# Patient Record
Sex: MALE | Race: WHITE | NOT HISPANIC OR LATINO | URBAN - METROPOLITAN AREA
[De-identification: names, ages, dates, MRNs, and addresses within clinical notes are randomized per-mention and may not be internally consistent; named-entity substitution may affect disease eponyms.]

---

## 2018-11-01 NOTE — PATIENT DISCUSSION
AMD (DRY), OS:  PRESCRIBE AREDS 2 VITAMINS / AMSLER GRID QD/ UV PROTECTION. SMOKING CESSATION EMPHASIZED. REFER TO DR. João Mcqueen.

## 2018-11-21 NOTE — PATIENT DISCUSSION
EPIMACULAR MEMBRANE, OD:  VISUALLY SIGNIFICANT. DISCUSSED SURGICAL OPTIONS - PATIENT NOT INTERESTED IN SURGERY AT THIS TIME. ELECTS TO PROCEED WITH PHACO SURGERY. RETURN AS SCHEDULED FOR OCT / EVALUATION.

## 2018-11-21 NOTE — PATIENT DISCUSSION
MYOPIC MACULAR DEGENERATION / AMD (WET), OS: DISCIFORM SCAR. REVIEWED RISK AND BENEFITS OF TREAT AND EXTEND REGIMEN VS PRN TREATMENT. THE PATIENT PREFERS TO CONTINUE WITH PRN TREATMENT.

## 2018-11-21 NOTE — PATIENT DISCUSSION
CNV Counseling: : I have reviewed with the patient the diagnosis of CHOROIDAL NEOVASCULARIZATION and its pathophysiology.  I further explained that this condition is a severe eye disease which should be monitored and treated by a retinal specialist.

## 2018-12-06 NOTE — PATIENT DISCUSSION
Surgery Counseling:  I have discussed the option of glasses versus cataract surgery versus following, It was explained that when vision no longer meets the patient's visual needs and a new prescription for glasses is not likely to improve the patient's visual symptoms, the option of cataract surgery is a reasonable next step. It was explained that there is no guarantee that removing the cataract will improve their visual symptoms; however, it is believed that the cataract is contributing to the patient's visual impairment and surgery may noticeably improve both the visual and functional status of the patient. After this discussion, the patient desires to proceed with cataract surgery with implantation of an intraocular lens to improve their vision for distance. I have prescribed Prolensa, Besivance, and Durezol to use as directed. Approved substitution of generics are Ofloxacin, Ketorolac, and Prednisolone.

## 2018-12-06 NOTE — PATIENT DISCUSSION
CATARACTS, OU- VISUALLY SIGNIFICANT. PT ELECTS TO PROCEED WITH SURGERY. SCHEDULE OS EYE FIRST THEN LATER IN THE OD EYE IF VISUAL SYMPTOMS PERSIST.

## 2018-12-06 NOTE — PATIENT DISCUSSION
New Prescription: ketorolac (ketorolac): drops: 0.5% 1 drop four times a day as directed into left eye 12-

## 2018-12-06 NOTE — PATIENT DISCUSSION
Plan for OS:  SN60WF 21.50  (distance) (if patient desires for OS can use Odella Rave but patient will need definitely Odella Rave for the right eye).

## 2018-12-06 NOTE — PATIENT DISCUSSION
New Prescription: prednisolone acetate (PF) (prednisolone acetate (pf)): drops,suspension: 1% 1 drop four times a day as directed into left eye 12-

## 2018-12-18 NOTE — PATIENT DISCUSSION
Post-Op Instructions: The patient was instructed in the proper use of post-operative eye drops: Ofloxacin in the surgical eye 4 times per day for 2 weeks, then discontinue; Ketorolac 4 times per day for 10 days, then decrease to 3 times per day for 1 week, then 2 times per day for 1 week, then 1 time per day for 4 days; Prednisolone 4 times per day for 10 days, then reduce to 3 times per day for 1 week, then 2 times per day for 1 week, then 1 time per day for 4 days. Call back instructions, retinal detachment and endophthalmitis precautions given.

## 2019-01-03 NOTE — PATIENT DISCUSSION
Continue: prednisolone acetate (PF) (prednisolone acetate (pf)): drops,suspension: 1% 1 drop four times a day as directed into left eye 12-

## 2020-08-21 ENCOUNTER — IMPORTED ENCOUNTER (OUTPATIENT)
Dept: URBAN - METROPOLITAN AREA CLINIC 50 | Facility: CLINIC | Age: 75
End: 2020-08-21

## 2020-08-25 ENCOUNTER — IMPORTED ENCOUNTER (OUTPATIENT)
Dept: URBAN - METROPOLITAN AREA CLINIC 50 | Facility: CLINIC | Age: 75
End: 2020-08-25

## 2020-08-25 NOTE — PATIENT DISCUSSION
"""Patient had a stroke due to right carotid occulison.  OD was in involved with several TIA episodes ""

## 2020-10-28 ENCOUNTER — IMPORTED ENCOUNTER (OUTPATIENT)
Dept: URBAN - METROPOLITAN AREA CLINIC 50 | Facility: CLINIC | Age: 75
End: 2020-10-28

## 2021-04-17 ASSESSMENT — VISUAL ACUITY
OS_SC: 20/50-
OS_PH: 20/25
OS_CC: J1@ 14 IN
OD_PH: 20/20
OD_SC: 20/50+2
OD_CC: J1@ 14 IN

## 2021-04-17 ASSESSMENT — TONOMETRY
OD_IOP_MMHG: 16
OS_IOP_MMHG: 16

## 2021-08-24 ENCOUNTER — PREPPED CHART (OUTPATIENT)
Dept: URBAN - METROPOLITAN AREA CLINIC 53 | Facility: CLINIC | Age: 76
End: 2021-08-24

## 2021-08-31 ENCOUNTER — ANNUAL COMPREHENSIVE EXAM (OUTPATIENT)
Dept: URBAN - METROPOLITAN AREA CLINIC 53 | Facility: CLINIC | Age: 76
End: 2021-08-31

## 2021-08-31 DIAGNOSIS — H35.373: ICD-10-CM

## 2021-08-31 DIAGNOSIS — H25.13: ICD-10-CM

## 2021-08-31 DIAGNOSIS — H35.033: ICD-10-CM

## 2021-08-31 DIAGNOSIS — H40.013: ICD-10-CM

## 2021-08-31 PROCEDURE — 92134 CPTRZ OPH DX IMG PST SGM RTA: CPT

## 2021-08-31 PROCEDURE — 92014 COMPRE OPH EXAM EST PT 1/>: CPT

## 2021-08-31 PROCEDURE — 92015 DETERMINE REFRACTIVE STATE: CPT

## 2021-08-31 ASSESSMENT — VISUAL ACUITY
OS_GLARE: 20/400
OD_PH: 20/25
OU_CC: J1+-1
OS_PH: 20/25+
OD_SC: 20/40-
OD_GLARE: 20/400
OU_SC: 20/40-/+
OS_SC: 20/50-

## 2021-08-31 ASSESSMENT — TONOMETRY
OD_IOP_MMHG: 16
OS_IOP_MMHG: 16

## 2021-08-31 NOTE — PATIENT DISCUSSION
Discussed with patient the carotids,  he states that he is closely followed.  He had a dye test for his carotids and he went from 40% blocked to 20% blocked on one side.  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

## 2021-08-31 NOTE — PATIENT DISCUSSION
Iop is in range today,  based on optic nerve size it is recommended patient schedule hvf/oct rnfl and pach next available.

## 2021-09-07 ENCOUNTER — DIAGNOSTICS - HVF/OCT (OUTPATIENT)
Dept: URBAN - METROPOLITAN AREA CLINIC 53 | Facility: CLINIC | Age: 76
End: 2021-09-07

## 2021-09-07 DIAGNOSIS — H40.013: ICD-10-CM

## 2021-09-07 PROCEDURE — 92133 CPTRZD OPH DX IMG PST SGM ON: CPT

## 2021-09-07 PROCEDURE — 92083 EXTENDED VISUAL FIELD XM: CPT

## 2022-09-06 ENCOUNTER — COMPREHENSIVE EXAM (OUTPATIENT)
Dept: URBAN - METROPOLITAN AREA CLINIC 53 | Facility: CLINIC | Age: 77
End: 2022-09-06

## 2022-09-06 DIAGNOSIS — H25.13: ICD-10-CM

## 2022-09-06 DIAGNOSIS — H35.373: ICD-10-CM

## 2022-09-06 DIAGNOSIS — H40.013: ICD-10-CM

## 2022-09-06 PROCEDURE — 92134 CPTRZ OPH DX IMG PST SGM RTA: CPT

## 2022-09-06 PROCEDURE — 92014 COMPRE OPH EXAM EST PT 1/>: CPT

## 2022-09-06 PROCEDURE — 76514 ECHO EXAM OF EYE THICKNESS: CPT

## 2022-09-06 ASSESSMENT — VISUAL ACUITY
OD_GLARE: 20/20-2
OS_GLARE: 20/20-2
OS_GLARE: 20/20-2
OS_CC: 20/20
OS_SC: 20/60-2
OD_SC: 20/40-1
OD_PH: 20/20
OD_CC: 20/20
OU_CC: J1+
OS_PH: 20/25
OD_GLARE: 20/20-1

## 2022-09-06 ASSESSMENT — PACHYMETRY
OS_CT_UM: 619
OD_CT_UM: 612

## 2022-09-06 ASSESSMENT — TONOMETRY
OD_IOP_MMHG: 17
OS_IOP_MMHG: 17
OD_IOP_MMHG: 12
OS_IOP_MMHG: 12

## 2023-10-03 ENCOUNTER — COMPREHENSIVE EXAM (OUTPATIENT)
Dept: URBAN - METROPOLITAN AREA CLINIC 53 | Facility: CLINIC | Age: 78
End: 2023-10-03

## 2023-10-03 DIAGNOSIS — H25.13: ICD-10-CM

## 2023-10-03 DIAGNOSIS — H35.033: ICD-10-CM

## 2023-10-03 DIAGNOSIS — H40.013: ICD-10-CM

## 2023-10-03 DIAGNOSIS — H35.373: ICD-10-CM

## 2023-10-03 PROCEDURE — 92134 CPTRZ OPH DX IMG PST SGM RTA: CPT

## 2023-10-03 PROCEDURE — 92015 DETERMINE REFRACTIVE STATE: CPT

## 2023-10-03 PROCEDURE — 99214 OFFICE O/P EST MOD 30 MIN: CPT

## 2023-10-03 ASSESSMENT — KERATOMETRY
OD_K2POWER_DIOPTERS: 44.50
OD_AXISANGLE_DEGREES: 096
OS_AXISANGLE_DEGREES: 074
OS_AXISANGLE2_DEGREES: 164
OD_AXISANGLE2_DEGREES: 6
OS_K1POWER_DIOPTERS: 44.25
OD_K1POWER_DIOPTERS: 44.00
OS_K2POWER_DIOPTERS: 45.00

## 2023-10-03 ASSESSMENT — VISUAL ACUITY
OD_CC: 20/20-2
OS_GLARE: 20/20
OS_GLARE: 20/20
OS_CC: 20/20-2
OD_GLARE: 20/20
OD_GLARE: 20/20

## 2023-10-03 ASSESSMENT — TONOMETRY
OS_IOP_MMHG: 12
OS_IOP_MMHG: 17
OD_IOP_MMHG: 13
OD_IOP_MMHG: 18

## 2024-12-31 ENCOUNTER — COMPREHENSIVE EXAM (OUTPATIENT)
Age: 79
End: 2024-12-31

## 2024-12-31 DIAGNOSIS — H35.013: ICD-10-CM

## 2024-12-31 DIAGNOSIS — H35.373: ICD-10-CM

## 2024-12-31 DIAGNOSIS — H25.13: ICD-10-CM

## 2024-12-31 DIAGNOSIS — H40.013: ICD-10-CM

## 2024-12-31 PROCEDURE — 92083 EXTENDED VISUAL FIELD XM: CPT

## 2024-12-31 PROCEDURE — 99214 OFFICE O/P EST MOD 30 MIN: CPT

## 2024-12-31 PROCEDURE — 92133 CPTRZD OPH DX IMG PST SGM ON: CPT
